# Patient Record
Sex: FEMALE | Race: BLACK OR AFRICAN AMERICAN | NOT HISPANIC OR LATINO | Employment: UNEMPLOYED | URBAN - METROPOLITAN AREA
[De-identification: names, ages, dates, MRNs, and addresses within clinical notes are randomized per-mention and may not be internally consistent; named-entity substitution may affect disease eponyms.]

---
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
## 2019-10-09 ENCOUNTER — HOSPITAL ENCOUNTER (EMERGENCY)
Facility: HOSPITAL | Age: 19
Discharge: HOME | End: 2019-10-09
Attending: EMERGENCY MEDICINE
Payer: MEDICAID

## 2019-10-09 VITALS
DIASTOLIC BLOOD PRESSURE: 82 MMHG | TEMPERATURE: 99 F | SYSTOLIC BLOOD PRESSURE: 124 MMHG | OXYGEN SATURATION: 100 % | RESPIRATION RATE: 18 BRPM | HEART RATE: 85 BPM

## 2019-10-09 DIAGNOSIS — N92.6 IRREGULAR MENSES: Primary | ICD-10-CM

## 2019-10-09 LAB — B-HCG UR QL: NEGATIVE

## 2019-10-09 PROCEDURE — 99282 EMERGENCY DEPT VISIT SF MDM: CPT | Mod: ER

## 2019-10-09 PROCEDURE — 81025 URINE PREGNANCY TEST: CPT | Mod: ER

## 2019-10-09 NOTE — ED PROVIDER NOTES
Encounter Date: 10/9/2019       History     Chief Complaint   Patient presents with    Possible Pregnancy     pt to ED for pregnancy test. LMP: 9/8/19. Pt has not taken home pregnancy test.     Patient is a 18-year-old female presenting with complaint she has about 5 days late for her..  No pelvic pain. She is sexually active.  She has not checked a pregnancy test.  She is worried she could be pregnant.        Review of patient's allergies indicates:  No Known Allergies  History reviewed. No pertinent past medical history.  History reviewed. No pertinent surgical history.  History reviewed. No pertinent family history.  Social History     Tobacco Use    Smoking status: Never Smoker   Substance Use Topics    Alcohol use: Not Currently    Drug use: Not on file     Review of Systems   Constitutional: Negative for activity change, appetite change, chills and fever.   Genitourinary: Negative for dysuria, flank pain, frequency, pelvic pain, vaginal bleeding and vaginal discharge.   Musculoskeletal: Negative for back pain.   All other systems reviewed and are negative.      Physical Exam     Initial Vitals [10/09/19 1736]   BP Pulse Resp Temp SpO2   132/79 90 17 98.8 °F (37.1 °C) 100 %      MAP       --         Physical Exam    Nursing note and vitals reviewed.  Constitutional: She appears well-developed and well-nourished. No distress.   Cardiovascular: Normal rate, regular rhythm and intact distal pulses.   Pulmonary/Chest: Breath sounds normal. No respiratory distress.   Neurological: She is alert and oriented to person, place, and time.   Skin: Skin is warm and dry. No rash noted.         ED Course   Procedures  Labs Reviewed   PREGNANCY TEST, URINE RAPID          Imaging Results    None          Medical Decision Making:   Clinical Tests:   Lab Tests: Ordered and Reviewed  The following lab test(s) were unremarkable: UPT       <> Summary of Lab: UPT negative  Discussed with patient that the emergency department is  not the appropriate place for pregnancy tests in the absence of pelvic pain, bleeding or concern for pregnancy related complication.  She was advised to follow up with her PCP or gynecologist.                      Clinical Impression:       ICD-10-CM ICD-9-CM   1. Irregular menses N92.6 626.4         Disposition:   Disposition: Discharged                        PETE Villalba  10/09/19 7773

## 2021-07-28 ENCOUNTER — HOSPITAL ENCOUNTER (EMERGENCY)
Facility: HOSPITAL | Age: 21
Discharge: HOME | End: 2021-07-28
Attending: EMERGENCY MEDICINE
Payer: MEDICAID

## 2021-07-28 VITALS
SYSTOLIC BLOOD PRESSURE: 119 MMHG | DIASTOLIC BLOOD PRESSURE: 77 MMHG | WEIGHT: 160 LBS | HEART RATE: 88 BPM | TEMPERATURE: 99 F | OXYGEN SATURATION: 100 % | RESPIRATION RATE: 20 BRPM

## 2021-07-28 DIAGNOSIS — U07.1 COVID-19: Primary | ICD-10-CM

## 2021-07-28 LAB
CTP QC/QA: YES
SARS-COV-2 RDRP RESP QL NAA+PROBE: POSITIVE

## 2021-07-28 PROCEDURE — 25000003 PHARM REV CODE 250: Mod: ER | Performed by: EMERGENCY MEDICINE

## 2021-07-28 PROCEDURE — U0002 COVID-19 LAB TEST NON-CDC: HCPCS | Mod: ER | Performed by: EMERGENCY MEDICINE

## 2021-07-28 PROCEDURE — 99284 EMERGENCY DEPT VISIT MOD MDM: CPT | Mod: 25,ER

## 2021-07-28 RX ORDER — ONDANSETRON 4 MG/1
4 TABLET, FILM COATED ORAL EVERY 6 HOURS
Qty: 12 TABLET | Refills: 0 | Status: SHIPPED | OUTPATIENT
Start: 2021-07-28

## 2021-07-28 RX ORDER — IBUPROFEN 600 MG/1
600 TABLET ORAL EVERY 6 HOURS PRN
Qty: 20 TABLET | Refills: 0 | Status: SHIPPED | OUTPATIENT
Start: 2021-07-28

## 2021-07-28 RX ORDER — IBUPROFEN 600 MG/1
600 TABLET ORAL
Status: COMPLETED | OUTPATIENT
Start: 2021-07-28 | End: 2021-07-28

## 2021-07-28 RX ADMIN — IBUPROFEN 600 MG: 600 TABLET ORAL at 10:07

## 2023-12-23 ENCOUNTER — HOSPITAL ENCOUNTER (EMERGENCY)
Facility: HOSPITAL | Age: 23
Discharge: HOME | End: 2023-12-23
Attending: FAMILY MEDICINE

## 2023-12-23 VITALS
HEART RATE: 104 BPM | RESPIRATION RATE: 16 BRPM | OXYGEN SATURATION: 99 % | HEIGHT: 66 IN | DIASTOLIC BLOOD PRESSURE: 74 MMHG | WEIGHT: 198.19 LBS | SYSTOLIC BLOOD PRESSURE: 125 MMHG | BODY MASS INDEX: 31.85 KG/M2 | TEMPERATURE: 100 F

## 2023-12-23 DIAGNOSIS — J11.1 INFLUENZA: Primary | ICD-10-CM

## 2023-12-23 DIAGNOSIS — R09.81 NASAL CONGESTION: ICD-10-CM

## 2023-12-23 LAB
INFLUENZA A, MOLECULAR: NEGATIVE
INFLUENZA B, MOLECULAR: POSITIVE
SARS-COV-2 RDRP RESP QL NAA+PROBE: NEGATIVE
SPECIMEN SOURCE: ABNORMAL

## 2023-12-23 PROCEDURE — U0002 COVID-19 LAB TEST NON-CDC: HCPCS | Mod: ER

## 2023-12-23 PROCEDURE — 87502 INFLUENZA DNA AMP PROBE: CPT | Mod: ER

## 2023-12-23 PROCEDURE — 99282 EMERGENCY DEPT VISIT SF MDM: CPT | Mod: ER

## 2023-12-23 NOTE — ED PROVIDER NOTES
Encounter Date: 12/23/2023       History     Chief Complaint   Patient presents with    Nasal Congestion     Patient presents to ED with nasal congestion,cough, headaches, bodyaches since Monday.      Patient is a 23-year-old female with no significant past medical history who presents to emergency room with nasal congestion, dry cough, headaches, and body aches for the past 5-6 days.  Denies abdominal pain, nausea, vomiting, lightheadedness, chest pain, shortness of breath, fever, or changes in bowel movements.  No treatment attempted prior to arrival.  Denies any known sick contacts.    The history is provided by the patient. No  was used.     Review of patient's allergies indicates:  No Known Allergies  History reviewed. No pertinent past medical history.  History reviewed. No pertinent surgical history.  History reviewed. No pertinent family history.  Social History     Tobacco Use    Smoking status: Never   Substance Use Topics    Alcohol use: Not Currently     Review of Systems   Constitutional:  Negative for chills, diaphoresis, fatigue and fever.   HENT:  Positive for congestion. Negative for sore throat and trouble swallowing.    Respiratory:  Positive for cough. Negative for shortness of breath.    Cardiovascular:  Negative for chest pain and palpitations.   Gastrointestinal:  Negative for abdominal pain, blood in stool, constipation, diarrhea, nausea and vomiting.   Genitourinary:  Negative for difficulty urinating, dysuria, frequency and urgency.   Musculoskeletal:  Positive for myalgias. Negative for back pain.   Skin:  Negative for rash and wound.   Neurological:  Positive for headaches. Negative for weakness, light-headedness and numbness.       Physical Exam     Initial Vitals [12/23/23 1046]   BP Pulse Resp Temp SpO2   125/74 104 16 100.3 °F (37.9 °C) 99 %      MAP       --         Physical Exam    Nursing note and vitals reviewed.  Constitutional: She appears well-developed and  well-nourished. She is not diaphoretic. No distress.   HENT:   Head: Normocephalic and atraumatic.   Right Ear: External ear normal.   Left Ear: External ear normal.   Nose: Right sinus exhibits no maxillary sinus tenderness and no frontal sinus tenderness. Left sinus exhibits no maxillary sinus tenderness and no frontal sinus tenderness.   Mouth/Throat: Uvula is midline, oropharynx is clear and moist and mucous membranes are normal. No oropharyngeal exudate, posterior oropharyngeal edema or posterior oropharyngeal erythema.   Patient congested.   Eyes: Conjunctivae and EOM are normal. Pupils are equal, round, and reactive to light.   Neck: Neck supple.   Normal range of motion.  Cardiovascular:  Normal rate, regular rhythm and normal heart sounds.     Exam reveals no friction rub.       No murmur heard.  Pulmonary/Chest: Breath sounds normal. No respiratory distress. She has no wheezes. She has no rhonchi. She has no rales. She exhibits no tenderness.   Adequate airflow to all lung fields.   Abdominal: Abdomen is soft. Bowel sounds are normal. She exhibits no distension. There is no abdominal tenderness.   Musculoskeletal:         General: No tenderness or edema. Normal range of motion.      Cervical back: Normal range of motion and neck supple.     Neurological: She is alert and oriented to person, place, and time. She has normal strength.   Skin: Skin is warm and dry.   Psychiatric: She has a normal mood and affect. Her behavior is normal. Thought content normal.         ED Course   Procedures  Labs Reviewed   INFLUENZA A & B BY MOLECULAR - Abnormal; Notable for the following components:       Result Value    Influenza B, Molecular Positive (*)     All other components within normal limits   SARS-COV-2 RNA AMPLIFICATION, QUAL    Narrative:     Is the patient symptomatic?->Yes          Imaging Results    None          Medications - No data to display  Medical Decision Making  Patient presents for multiple upper  respiratory symptoms.  Vital signs stable and within normal limits.  Physical exam as stated above.    Differential Diagnosis includes, but is not limited to bacterial sinusitis, allergic rhinitis, peritonsillar abscess, bacterial/viral pharyngitis, bronchitis, influenza, viral syndrome such as COVID.  Do not suspect bacterial sinusitis, as patient without congestion or sinus pressure/tenderness.  Physical exam with uvula midline. No abscess, erythema, or edema present. Patient without cough for >5 days; therefore, unlikely bronchitis. COVID test negative.  Influenza test positive.  Clinical presentation aligns with positive flu test.  Patient not a candidate for Tamiflu, as she is 5-6 days out of her symptoms.  Advised on over-the-counter symptomatic management.    I see no indication of an emergent process beyond that addressed during our encounter. Patient stable for discharge at this time. I have counseled the patient regarding follow up with PCP and gave strict return precautions. I have discussed the final diagnosis and gave instructions regarding OTC medications. Patient verbalized understanding and is agreeable.     Amount and/or Complexity of Data Reviewed  Labs:  Decision-making details documented in ED Course.               ED Course as of 12/23/23 1543   Sat Dec 23, 2023   1144 COVID-19 Rapid Screening  COVID negative. [BJ]   1153 Influenza A & B by Molecular(!)  Influenza positive. [BJ]      ED Course User Index  [BJ] Cori Luna PA-C                           Clinical Impression:  Final diagnoses:  [J11.1] Influenza (Primary)  [R09.81] Nasal congestion          ED Disposition Condition    Discharge Stable          ED Prescriptions    None       Follow-up Information       Follow up With Specialties Details Why Contact Info    Your doctor                 Cori Luna PA-C  12/23/23 1543

## 2023-12-23 NOTE — DISCHARGE INSTRUCTIONS
Your visit in the emergency room today determined that you have a viral illness. This may take around 7 days to pass, but can be managed with over the counter medications. Please see some of my recommendations below:     If not allergic, please take over the counter Tylenol (Acetaminophen) and/or Motrin (Ibuprofen) as directed for control of pain (body aches) and/or fever. You can stagger the dosing so you are taking one or the other every three hours while spacing out the Tylenol and every 6 hours and the Motrin every 6 hours.    You may take an over the counter antihistamine medication (Allegra/Claritin/Zyrtec) as directed for nasal congestion/runny nose. You may also try a decongestant such as Mucinex D or Sudafed (found behind the pharmacy counter). Flonase is also an option that you may use- one spray each nostril twice daily OR two sprays each nostril once daily.    If you have a cough with mucus production, try an Mucinex or Robitussin. If you have a dry cough, Delsym may work better.      Sore throat recommendations: Warm fluids, warm salt water gargles, throat lozenges, tea, honey, soup, rest, hydration.     Please return or see your primary care doctor if you develop new or worsening symptoms.     Thank you for allowing me and my emergency team to take care of you here today! I hope you feel better soon. Please do not hesitate to return with any additional concerns that may arise from this or any new problem you encounter.    Our goal in the emergency department is to always give you outstanding care and exceptional service. If you receive a survey by mail or e-mail in the next week regarding your experience in our ED, we would greatly appreciate you completing it. Your feedback provides us with a way to recognize our staff who give very good care and it helps us learn how to improve when your experience was below the excellence we aspire to be!    Brook Juneau, PA-C [facility], [place], and   [facility]   Emergency Room Physician Assistant

## 2023-12-23 NOTE — Clinical Note
"Hanny Gannon" Levy was seen and treated in our emergency department on 12/23/2023.  She may return to work on 12/25/2023.       If you have any questions or concerns, please don't hesitate to call.      Cori Luna PA-C"

## 2024-08-26 ENCOUNTER — HOSPITAL ENCOUNTER (EMERGENCY)
Facility: HOSPITAL | Age: 24
Discharge: HOME | End: 2024-08-26
Attending: EMERGENCY MEDICINE

## 2024-08-26 VITALS
WEIGHT: 198 LBS | HEIGHT: 66 IN | TEMPERATURE: 98 F | DIASTOLIC BLOOD PRESSURE: 70 MMHG | RESPIRATION RATE: 17 BRPM | BODY MASS INDEX: 31.82 KG/M2 | SYSTOLIC BLOOD PRESSURE: 120 MMHG | HEART RATE: 89 BPM | OXYGEN SATURATION: 99 %

## 2024-08-26 DIAGNOSIS — S99.921A RIGHT FOOT INJURY: ICD-10-CM

## 2024-08-26 DIAGNOSIS — S93.601A RIGHT FOOT SPRAIN, INITIAL ENCOUNTER: Primary | ICD-10-CM

## 2024-08-26 LAB
B-HCG UR QL: NEGATIVE
CTP QC/QA: YES

## 2024-08-26 PROCEDURE — 99283 EMERGENCY DEPT VISIT LOW MDM: CPT | Mod: 25,ER

## 2024-08-26 PROCEDURE — 81025 URINE PREGNANCY TEST: CPT | Mod: ER | Performed by: EMERGENCY MEDICINE

## 2024-08-26 PROCEDURE — 25000003 PHARM REV CODE 250: Mod: ER | Performed by: EMERGENCY MEDICINE

## 2024-08-26 RX ORDER — DICLOFENAC SODIUM 75 MG/1
75 TABLET, DELAYED RELEASE ORAL 2 TIMES DAILY
Qty: 60 TABLET | Refills: 0 | Status: SHIPPED | OUTPATIENT
Start: 2024-08-26

## 2024-08-26 RX ORDER — ACETAMINOPHEN 500 MG
1000 TABLET ORAL
Status: COMPLETED | OUTPATIENT
Start: 2024-08-26 | End: 2024-08-26

## 2024-08-26 RX ADMIN — ACETAMINOPHEN 1000 MG: 500 TABLET ORAL at 11:08

## 2024-08-26 NOTE — Clinical Note
"Hanny Gannon" Levy was seen and treated in our emergency department on 8/26/2024.  She may return to work on 08/29/2024.  Has a foot sprain and needs to stay off of her foot for a couple of days. Needs to wear the post op shoe until her foot feels better.      If you have any questions or concerns, please don't hesitate to call.      Endy Allen RN    "

## 2024-08-27 NOTE — ED NOTES
Xray at bedside.  ok with a shield being used on patient. Patient aware that a urine sample is still needed though on case Dr. Crockett wants to give her anything for pain.

## 2024-08-27 NOTE — ED PROVIDER NOTES
Encounter Date: 8/26/2024       History     Chief Complaint   Patient presents with    foot pain     Reports to ED c c/o R foot pain. States fell in a pothole. Denies taking any OTC pain medication      HPI  23 y.o.   Co dorsal MT R foot pain twisted it in pothole  Able to walk  No other inj  Worse with movement  Mod  Nr  Today    Review of patient's allergies indicates:  No Known Allergies  History reviewed. No pertinent past medical history.  History reviewed. No pertinent surgical history.  No family history on file.  Social History     Tobacco Use    Smoking status: Never   Substance Use Topics    Alcohol use: Not Currently     Review of Systems  All systems were reviewed/examined and were negative except as noted in the HPI.    Physical Exam     Initial Vitals [08/26/24 2137]   BP Pulse Resp Temp SpO2   117/85 93 19 98.6 °F (37 °C) 100 %      MAP       --         Physical Exam    General: the patient is awake, alert, and in no apparent distress.  Head: normocephalic and atraumatic, sclera are clear  Neck: supple without meningismus  Chest: no respiratory distress  Heart: regular rate and rhythm  R foot mild MT tenderness, no open wounds, nvi  Rest of jose enrique/RLE wnl  Extremities: warm and well perfused  Skin: warm and dry  Psych conversant  Neuro: awake, alert, moving all extremities    ED Course   Procedures  Labs Reviewed   POCT URINE PREGNANCY       Result Value    POC Preg Test, Ur Negative       Acceptable Yes            Imaging Results              X-Ray Foot Complete Right (Final result)  Result time 08/26/24 22:35:23      Final result by Pastor Champion MD (08/26/24 22:35:23)                   Impression:      No acute fracture or dislocation      Electronically signed by: Pastor Champion  Date:    08/26/2024  Time:    22:35               Narrative:    EXAMINATION:  XR FOOT COMPLETE 3 VIEW RIGHT    CLINICAL HISTORY:  . Unspecified injury of right foot, initial encounter    TECHNIQUE:  AP,  lateral, and oblique views of the right foot were performed.    COMPARISON:  None    FINDINGS:  No fracture or dislocation.  Normal bone mineral density.  Soft tissues are grossly unremarkable with no evidence for foreign body                                       Medications   acetaminophen tablet 1,000 mg (1,000 mg Oral Given 8/26/24 2304)     Medical Decision Making  Amount and/or Complexity of Data Reviewed  Labs: ordered.  Radiology: ordered.    Risk  OTC drugs.  Prescription drug management.       Medical Decision Making:    This is an emergent evaluation of a patient presenting to the ED.  Nursing notes were reviewed.  Xr foot neg  I reviewed radiology images personally along with interpretations.  I personally reviewed and interpreted the laboratory results.  Hcg neg  I decided to obtain and review old medical records, which showed: ED visits    Evaluation for Emergency Medical Condition  The patient received a medical screening exam and within a reasonable degree of clinical confidence an emergency medical condition has not been identified.  The patient is instructed on proper follow up and return precautions to the ED.      Josue Crockett MD, CHERRY                                 Clinical Impression:  Final diagnoses:  [S99.921A] Right foot injury  [S93.601A] Right foot sprain, initial encounter (Primary)          ED Disposition Condition    Discharge Stable          ED Prescriptions       Medication Sig Dispense Start Date End Date Auth. Provider    diclofenac (VOLTAREN) 75 MG EC tablet Take 1 tablet (75 mg total) by mouth 2 (two) times daily. 60 tablet 8/26/2024 -- David Crockett MD          Follow-up Information       Follow up With Specialties Details Why Contact Info Additional Information    [facility] Family Medicine Schedule an appointment as soon as possible for a visit   [address], [address]  [place] [address] [phone] Please park in [address] or [address] and use  [address]. Take Medical Office Bldg. elevators.          Discharged to home in stable condition, return to ED warnings given, follow up and patient care instructions given.      Josue Crockett MD, CHERRY, FACEP  Department of Emergency Medicine       David Crockett MD  08/28/24 0204

## 2024-10-04 ENCOUNTER — HOSPITAL ENCOUNTER (EMERGENCY)
Facility: HOSPITAL | Age: 24
Discharge: HOME | End: 2024-10-05
Attending: EMERGENCY MEDICINE

## 2024-10-04 DIAGNOSIS — S00.83XA FOREHEAD CONTUSION, INITIAL ENCOUNTER: ICD-10-CM
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
## 2024-10-04 PROCEDURE — 99284 EMERGENCY DEPT VISIT MOD MDM: CPT | Mod: ER

## 2024-10-05 VITALS
OXYGEN SATURATION: 100 % | HEIGHT: 67 IN | DIASTOLIC BLOOD PRESSURE: 81 MMHG | HEART RATE: 128 BPM | RESPIRATION RATE: 18 BRPM | BODY MASS INDEX: 31.08 KG/M2 | WEIGHT: 198 LBS | SYSTOLIC BLOOD PRESSURE: 124 MMHG | TEMPERATURE: 98 F
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
